# Patient Record
Sex: FEMALE | Race: BLACK OR AFRICAN AMERICAN | Employment: UNEMPLOYED | URBAN - METROPOLITAN AREA
[De-identification: names, ages, dates, MRNs, and addresses within clinical notes are randomized per-mention and may not be internally consistent; named-entity substitution may affect disease eponyms.]

---

## 2024-05-01 ENCOUNTER — HOSPITAL ENCOUNTER (EMERGENCY)
Age: 44
Discharge: HOME | End: 2024-05-01
Attending: EMERGENCY MEDICINE
Payer: COMMERCIAL

## 2024-05-01 VITALS
HEIGHT: 65 IN | SYSTOLIC BLOOD PRESSURE: 179 MMHG | OXYGEN SATURATION: 98 % | WEIGHT: 293 LBS | RESPIRATION RATE: 15 BRPM | DIASTOLIC BLOOD PRESSURE: 115 MMHG | HEART RATE: 101 BPM | TEMPERATURE: 97.7 F | BODY MASS INDEX: 48.82 KG/M2

## 2024-05-01 DIAGNOSIS — G57.11 MERALGIA PARESTHETICA OF RIGHT SIDE: Primary | ICD-10-CM

## 2024-05-01 DIAGNOSIS — M53.3 SACROILIAC JOINT PAIN: ICD-10-CM

## 2024-05-01 LAB
ALBUMIN SERPL-MCNC: 4 G/DL (ref 3.5–5)
ALBUMIN/GLOB SERPL: 0.9 (ref 1–1.9)
ALP SERPL-CCNC: 69 U/L (ref 35–104)
ALT SERPL-CCNC: 25 U/L (ref 12–65)
ANION GAP SERPL CALC-SCNC: 13 MMOL/L (ref 9–18)
AST SERPL-CCNC: 30 U/L (ref 15–37)
BASOPHILS # BLD: 0 K/UL (ref 0–0.2)
BASOPHILS NFR BLD: 0 % (ref 0–2)
BILIRUB SERPL-MCNC: 0.3 MG/DL (ref 0–1.2)
BUN SERPL-MCNC: 12 MG/DL (ref 6–23)
CALCIUM SERPL-MCNC: 9.4 MG/DL (ref 8.8–10.2)
CHLORIDE SERPL-SCNC: 100 MMOL/L (ref 98–107)
CO2 SERPL-SCNC: 27 MMOL/L (ref 20–28)
CREAT SERPL-MCNC: 0.7 MG/DL (ref 0.6–1.1)
DIFFERENTIAL METHOD BLD: NORMAL
EOSINOPHIL # BLD: 0.1 K/UL (ref 0–0.8)
EOSINOPHIL NFR BLD: 1 % (ref 0.5–7.8)
ERYTHROCYTE [DISTWIDTH] IN BLOOD BY AUTOMATED COUNT: 14.6 % (ref 11.9–14.6)
GLOBULIN SER CALC-MCNC: 4.2 G/DL (ref 2.3–3.5)
GLUCOSE SERPL-MCNC: 106 MG/DL (ref 70–99)
HCT VFR BLD AUTO: 39 % (ref 35.8–46.3)
HGB BLD-MCNC: 12.8 G/DL (ref 11.7–15.4)
IMM GRANULOCYTES # BLD AUTO: 0 K/UL (ref 0–0.5)
IMM GRANULOCYTES NFR BLD AUTO: 0 % (ref 0–5)
LYMPHOCYTES # BLD: 3.2 K/UL (ref 0.5–4.6)
LYMPHOCYTES NFR BLD: 39 % (ref 13–44)
MCH RBC QN AUTO: 28 PG (ref 26.1–32.9)
MCHC RBC AUTO-ENTMCNC: 32.8 G/DL (ref 31.4–35)
MCV RBC AUTO: 85.3 FL (ref 82–102)
MONOCYTES # BLD: 0.5 K/UL (ref 0.1–1.3)
MONOCYTES NFR BLD: 6 % (ref 4–12)
NEUTS SEG # BLD: 4.3 K/UL (ref 1.7–8.2)
NEUTS SEG NFR BLD: 54 % (ref 43–78)
NRBC # BLD: 0 K/UL (ref 0–0.2)
PLATELET # BLD AUTO: 239 K/UL (ref 150–450)
PMV BLD AUTO: 11.1 FL (ref 9.4–12.3)
POTASSIUM SERPL-SCNC: 3.5 MMOL/L (ref 3.5–5.1)
PROT SERPL-MCNC: 8.2 G/DL (ref 6.3–8.2)
RBC # BLD AUTO: 4.57 M/UL (ref 4.05–5.2)
SODIUM SERPL-SCNC: 140 MMOL/L (ref 136–145)
WBC # BLD AUTO: 8.1 K/UL (ref 4.3–11.1)

## 2024-05-01 PROCEDURE — 85025 COMPLETE CBC W/AUTO DIFF WBC: CPT

## 2024-05-01 PROCEDURE — 80053 COMPREHEN METABOLIC PANEL: CPT

## 2024-05-01 PROCEDURE — 99283 EMERGENCY DEPT VISIT LOW MDM: CPT

## 2024-05-01 RX ORDER — PREDNISONE 20 MG/1
60 TABLET ORAL DAILY
Qty: 26 TABLET | Refills: 0 | Status: SHIPPED | OUTPATIENT
Start: 2024-05-01

## 2024-05-01 RX ORDER — CYCLOBENZAPRINE HCL 10 MG
10 TABLET ORAL 3 TIMES DAILY PRN
Qty: 21 TABLET | Refills: 0 | Status: SHIPPED | OUTPATIENT
Start: 2024-05-01 | End: 2024-05-08

## 2024-05-01 ASSESSMENT — PAIN DESCRIPTION - LOCATION: LOCATION: BACK

## 2024-05-01 ASSESSMENT — PAIN DESCRIPTION - ORIENTATION: ORIENTATION: LOWER

## 2024-05-01 ASSESSMENT — PAIN SCALES - GENERAL: PAINLEVEL_OUTOF10: 10

## 2024-05-01 ASSESSMENT — PAIN - FUNCTIONAL ASSESSMENT: PAIN_FUNCTIONAL_ASSESSMENT: 0-10

## 2024-05-01 NOTE — DISCHARGE INSTR - COC
Continuity of Care Form    Patient Name: Urbano Page   :  1980  MRN:  [id number]    Admit date:  2024  Discharge date:  ***    Code Status Order: No Order   Advance Directives:     Admitting Physician:  No admitting provider for patient encounter.  PCP: Feliberto Kaiser MD    Discharging Nurse: ***  Discharging Hospital Unit/Room#: D02/D02  Discharging Unit Phone Number: ***    Emergency Contact:   Extended Emergency Contact Information  Primary Emergency Contact: GeraldDenver  Home Phone: [phone]  Relation: Child  Secondary Emergency Contact: Bryan Page  Home Phone: [phone]  Relation: Spouse    Past Surgical History:  No past surgical history on file.    Immunization History:   Immunization History   Administered Date(s) Administered    COVID-19, PFIZER PURPLE top, DILUTE for use, (age 12 y+), 30mcg/0.3mL 2021, 2021       Active Problems:  Patient Active Problem List   Diagnosis Code    Alopecia areata L63.9    Essential hypertension, benign I10    Hypopotassemia E87.6    Insomnia, unspecified G47.00       Isolation/Infection:   Isolation            No Isolation          Patient Infection Status       None to display            Nurse Assessment:  Last Vital Signs: BP (!) 179/115   Pulse (!) 101   Temp 97.7 °F (36.5 °C) (Oral)   Resp 15   Ht 1.645 m (5' 4.75\")   Wt (!) 151.3 kg (333 lb 9.6 oz)   SpO2 98%   BMI 55.94 kg/m²     Last documented pain score (0-10 scale): Pain Level: 10  Last Weight:   Wt Readings from Last 1 Encounters:   24 (!) 151.3 kg (333 lb 9.6 oz)     Mental Status:  {IP PT MENTAL STATUS:58169}    IV Access:  { EB IV ACCESS:184157224}    Nursing Mobility/ADLs:  Walking   {CHP DME ADLs:055519483}  Transfer  {CHP DME ADLs:863174968}  Bathing  {CHP DME ADLs:765052419}  Dressing  {CHP DME ADLs:293731143}  Toileting  {CHP DME ADLs:862041137}  Feeding  {CHP DME ADLs:766017478}  Med Admin  {CHP [facility] ADLs:088522805}  Med Delivery   { EB

## 2024-05-01 NOTE — DISCHARGE INSTRUCTIONS
Outpatient MRI when able  Follow up with neurology  Follow up with orthopedics  Switch from heat to ice for the low back

## 2024-05-01 NOTE — ED PROVIDER NOTES
Emergency Department Provider Note       PCP: Feliberto Kaiser MD   Age: 43 y.o.   Sex: female     DISPOSITION       No diagnosis found.    Medical Decision Making     43-year-old female with morbid obesity fibromyalgia and arthritis presents with 7 or more months of low back pain which seems to be sacroiliac in nature.  Will add muscle relaxer and prednisone 12-day taper to her pain medicine regimen, discussed MRI initially before symptoms became more clear.  Patient still wants lumbar MRI.  A requisition has been faxed to outpatient radiology scheduling.    I believe her right thigh numbness for the last 4 months is meralgia paresthetica.  She is already on a maximum dose of Lyrica.  Hopefully the prednisone may help with the some     1 or more chronic illnesses with a severe exacerbation or progression.  Over the counter drug management performed.  Prescription drug management performed.  Patient was discharged risks and benefits of hospitalization were considered.  Shared medical decision making was utilized in creating the patients health plan today.    I independently ordered and reviewed each unique test.                     History     43-year-old female presents to the ER for low back pain and right thigh numbness.  Low back pain has been present since October of last year.  It is most focally situated at the right sacroiliac joint.  There is no sciatica or bowel or bladder dysfunction to suggest cauda equina syndrome.  She is followed by rheumatology for fibromyalgia, and is currently on Lyrica 200 mg 3 times daily also takes ibuprofen 800 mg 2-3 times a day, as well as scheduled Norco tens 4 times a day.    Patient has had numbness to the right anterolateral thigh since January.  It is a dense numbness and she cannot even feel light or painful touch there.  It does not extend to the medial thigh or below the knee.    Patient has had a few falls, most recently 3 weeks ago, and then the week before that as

## 2024-05-01 NOTE — ED TRIAGE NOTES
C/o L lower back pain, headaches, and R thigh numbness (numbness started in October). Says she has fallen a few times recently and has also been having balance issues. Has taken her prescribed norco lately w/ no relief.

## 2024-05-01 NOTE — ED NOTES
Patient mobility status  with no difficulty. Provider aware     I have reviewed discharge instructions with the patient.  The patient verbalized understanding.    Patient left ED via Discharge Method: ambulatory to Home with  self .    Opportunity for questions and clarification provided.     Patient given 0 scripts.           Dimple Long LPN  05/01/24 1696

## 2024-05-02 ASSESSMENT — ENCOUNTER SYMPTOMS: BACK PAIN: 1

## 2024-05-07 ENCOUNTER — HOSPITAL ENCOUNTER (OUTPATIENT)
Dept: MRI IMAGING | Age: 44
Discharge: HOME | End: 2024-05-10
Attending: EMERGENCY MEDICINE
Payer: COMMERCIAL

## 2024-05-07 DIAGNOSIS — R20.0 LEG NUMBNESS: ICD-10-CM

## 2024-05-07 DIAGNOSIS — M54.9 BACK PAIN, UNSPECIFIED BACK LOCATION, UNSPECIFIED BACK PAIN LATERALITY, UNSPECIFIED CHRONICITY: ICD-10-CM

## 2024-05-07 PROCEDURE — 72148 MRI LUMBAR SPINE W/O DYE: CPT

## 2024-05-21 ENCOUNTER — OFFICE VISIT (OUTPATIENT)
Age: 44
End: 2024-05-21
Payer: COMMERCIAL

## 2024-05-21 DIAGNOSIS — M47.816 FACET ARTHROPATHY, LUMBAR: ICD-10-CM

## 2024-05-21 DIAGNOSIS — R29.2 GENERALIZED HYPERREFLEXIA: ICD-10-CM

## 2024-05-21 DIAGNOSIS — M54.16 LUMBAR RADICULOPATHY: ICD-10-CM

## 2024-05-21 DIAGNOSIS — M51.36 DDD (DEGENERATIVE DISC DISEASE), LUMBAR: Primary | ICD-10-CM

## 2024-05-21 DIAGNOSIS — G57.11 MERALGIA PARESTHETICA OF RIGHT SIDE: ICD-10-CM

## 2024-05-21 DIAGNOSIS — M54.12 CERVICAL RADICULOPATHY: ICD-10-CM

## 2024-05-21 PROCEDURE — 99204 OFFICE O/P NEW MOD 45 MIN: CPT | Performed by: PHYSICIAN ASSISTANT

## 2024-05-21 RX ORDER — OLMESARTAN MEDOXOMIL 40 MG/1
40 TABLET ORAL DAILY
COMMUNITY
Start: 2024-01-22

## 2024-05-21 RX ORDER — ONDANSETRON 4 MG/1
4 TABLET, ORALLY DISINTEGRATING ORAL 3 TIMES DAILY PRN
COMMUNITY
Start: 2022-06-08

## 2024-05-21 RX ORDER — SPIRONOLACTONE 25 MG/1
25 TABLET ORAL DAILY
COMMUNITY
Start: 2024-01-22

## 2024-05-21 RX ORDER — TRAZODONE HYDROCHLORIDE 100 MG/1
200 TABLET ORAL NIGHTLY
COMMUNITY

## 2024-05-21 RX ORDER — HYDROXYZINE PAMOATE 25 MG/1
CAPSULE ORAL
COMMUNITY
Start: 2024-05-10

## 2024-05-21 RX ORDER — HYDROCODONE BITARTRATE AND ACETAMINOPHEN 10; 325 MG/1; MG/1
TABLET ORAL
COMMUNITY

## 2024-05-21 RX ORDER — DEXTROAMPHETAMINE SACCHARATE, AMPHETAMINE ASPARTATE MONOHYDRATE, DEXTROAMPHETAMINE SULFATE AND AMPHETAMINE SULFATE 7.5; 7.5; 7.5; 7.5 MG/1; MG/1; MG/1; MG/1
30 CAPSULE, EXTENDED RELEASE ORAL 2 TIMES DAILY
COMMUNITY

## 2024-05-21 RX ORDER — CLONAZEPAM 1 MG/1
1 TABLET ORAL 2 TIMES DAILY
COMMUNITY

## 2024-05-21 RX ORDER — ALBUTEROL SULFATE 90 UG/1
2 AEROSOL, METERED RESPIRATORY (INHALATION) EVERY 4 HOURS PRN
COMMUNITY
Start: 2023-12-04

## 2024-05-21 RX ORDER — IBUPROFEN 800 MG/1
800 TABLET ORAL 3 TIMES DAILY PRN
COMMUNITY

## 2024-05-21 RX ORDER — TORSEMIDE 20 MG/1
60 TABLET ORAL DAILY
COMMUNITY
Start: 2024-05-09

## 2024-05-21 RX ORDER — CYCLOBENZAPRINE HCL 10 MG
10 TABLET ORAL 3 TIMES DAILY PRN
COMMUNITY

## 2024-05-21 RX ORDER — PREGABALIN 75 MG/1
200 CAPSULE ORAL 3 TIMES DAILY
COMMUNITY

## 2024-05-21 NOTE — PROGRESS NOTES
Name: Urbano Page  YOB: 1980  Gender: female  MRN: [id number]    CC: Back Pain (Low back pain and right thigh numbness)       HPI: This is a 43 y.o. year old female who  Is referred to me from [facility] emergency department after presenting with over 7-month history of lower back pain.   Back pain began around August 2023 mostly the left side of the back left upper buttock pain.  She then began having numbness in the right thigh December 23 it does not go below the knee.  More the pain is on the left and it is worse after standing for an hour.  She describes it as a blowtorch sensation.  Symptoms are exacerbated with sitting standing laying down.  She has tried Lyrica, hydrocodone, ibuprofen.  She is under the care of Dr. Veliz with pain management.  He has treated cervical spine but has not treated her for the lumbar problems.    MRI of the lumbar spine was ordered at the emergency department and she has it today.    The patient does say she has neck pain decreased range of motion of the cervical spine.  She does notice weakness with her hands.           5/21/2024    12:59 PM   AMB PAIN ASSESSMENT   Location of Pain Back    Location Modifiers Right   Quality of Pain Aching;Other (Comment)    Duration of Pain Persistent   Frequency of Pain Constant   Date Pain First Started 10/10/2023   Aggravating Factors Bending   Limiting Behavior Some   Relieving Factors Other (Comment)    Result of Injury No   Work-Related Injury No   Are there other pain locations you wish to document? No       Significant value              ROS/Meds/PSH/PMH/FH/SH: I personally reviewed the patient's collected intake data.  Below are the pertinents:    No Known Allergies      Current Outpatient Medications:     albuterol sulfate HFA (PROVENTIL;VENTOLIN;PROAIR) 108 (90 Base) MCG/ACT inhaler, Inhale 2 puffs into the lungs every 4 hours as needed, Disp: , Rfl:     amphetamine-dextroamphetamine (ADDERALL XR) 30 MG extended

## 2024-05-27 NOTE — PROGRESS NOTES
[facility] [address], [address]  [place] [address]  [phone]      Chief Complaint   Patient presents with    ED Follow-up     Meralgia paresthetica, cognitive changes       HPI  Letorrjohnny Page is a 43 y.o. female with past medical history of fibromyalgia who presents for ED follow-up.  She is present with her  who helps with history.  On 05/01 patient was feeling severe pain in her back and right lower extremity.  She describes the pain as hot/burning and \"like knives are slicing through her leg and back.\"  She was told she had possible nerve involvement and was discharged from the ED with follow-up to neurology and orthospine.  She has been dealing with generalized pain for a long time, and has been seeing pain management this, as well as rheumatology in the past.  This new onset of right lower extremity pain and changing low back pain started in about January.  The back pain seems to be worse with bending over and changing positions, the right leg pain has less positional components to it.  Right leg pain is generally in the outside of her right leg, does not usually cross into medial side of her leg however does cover the front as well.  She does not feel it in the back of her leg other than tightness.  She has been evaluated by orthospine and they are not recommending surgical interventions at this time.  She continues on with other complaints about stressors in her life over the last few years.  She expresses deep sadness and concern over her inability to work and her generalized state of pain.  She has concerns over the stigma of her going to pain management, and also is feeling a little hope regarding her recovery process.  During her most recent pain management appointment they told her that she had a positive Veronique reflex, but this was not recreated at her orthospine visit.  She is currently seeing psychiatry and is on multiple medications that she has

## 2024-05-28 ENCOUNTER — OFFICE VISIT (OUTPATIENT)
Dept: NEUROLOGY | Age: 44
End: 2024-05-28
Payer: COMMERCIAL

## 2024-05-28 VITALS
HEIGHT: 65 IN | WEIGHT: 293 LBS | SYSTOLIC BLOOD PRESSURE: 138 MMHG | OXYGEN SATURATION: 97 % | DIASTOLIC BLOOD PRESSURE: 88 MMHG | HEART RATE: 98 BPM | BODY MASS INDEX: 48.82 KG/M2

## 2024-05-28 DIAGNOSIS — M54.50 CHRONIC LEFT-SIDED LOW BACK PAIN, UNSPECIFIED WHETHER SCIATICA PRESENT: ICD-10-CM

## 2024-05-28 DIAGNOSIS — G57.11 MERALGIA PARESTHETICA OF RIGHT SIDE: Primary | ICD-10-CM

## 2024-05-28 DIAGNOSIS — G89.29 CHRONIC LEFT-SIDED LOW BACK PAIN, UNSPECIFIED WHETHER SCIATICA PRESENT: ICD-10-CM

## 2024-05-28 DIAGNOSIS — F32.A DEPRESSION, UNSPECIFIED DEPRESSION TYPE: ICD-10-CM

## 2024-05-28 PROCEDURE — 99205 OFFICE O/P NEW HI 60 MIN: CPT | Performed by: PHYSICAL THERAPIST

## 2024-05-28 PROCEDURE — 3079F DIAST BP 80-89 MM HG: CPT | Performed by: PHYSICAL THERAPIST

## 2024-05-28 PROCEDURE — 3075F SYST BP GE 130 - 139MM HG: CPT | Performed by: PHYSICAL THERAPIST

## 2024-05-28 ASSESSMENT — PATIENT HEALTH QUESTIONNAIRE - PHQ9
SUM OF ALL RESPONSES TO PHQ QUESTIONS 1-9: 1
2. FEELING DOWN, DEPRESSED OR HOPELESS: SEVERAL DAYS
SUM OF ALL RESPONSES TO PHQ QUESTIONS 1-9: 1
SUM OF ALL RESPONSES TO PHQ9 QUESTIONS 1 & 2: 1
1. LITTLE INTEREST OR PLEASURE IN DOING THINGS: NOT AT ALL
SUM OF ALL RESPONSES TO PHQ QUESTIONS 1-9: 1
SUM OF ALL RESPONSES TO PHQ QUESTIONS 1-9: 1

## 2024-06-04 RX ORDER — PREDNISONE 20 MG/1
40 TABLET ORAL DAILY
COMMUNITY
Start: 2023-12-04

## 2024-06-05 ENCOUNTER — OFFICE VISIT (OUTPATIENT)
Age: 44
End: 2024-06-05
Payer: COMMERCIAL

## 2024-06-05 DIAGNOSIS — M48.02 CERVICAL SPINAL STENOSIS: Primary | ICD-10-CM

## 2024-06-05 DIAGNOSIS — M50.10 HERNIATION OF CERVICAL INTERVERTEBRAL DISC WITH RADICULOPATHY: ICD-10-CM

## 2024-06-05 PROCEDURE — 99214 OFFICE O/P EST MOD 30 MIN: CPT | Performed by: PHYSICIAN ASSISTANT

## 2024-06-05 NOTE — PATIENT INSTRUCTIONS
Cervical Stenosis    What is cervical stenosis?  Cervical stenosis is a condition in which the spinal canal in the neck is too small for the spinal cord and exiting spinal nerves.   The narrow canal results in pinching of these important structures, which can eventually lead to a variety of symptoms.  Spinal stenosis can develop after a single event, such as a fall or other injury.  But, more often it develops over a period of months or years due to progressive degenerative and arthritic changes in the spine.  Though the patient may not be aware of its development, spinal stenosis may become rapidly apparent once the dimensions of the spinal canal become critically small.             What causes cervical stenosis?  Again, cervical stenosis may be the result of an acute injury such as a fracture or a disc herniation.  However, in most cases cervical stenosis develops as a result of a combination of disc degeneration, bone spurs, thickened ligaments, and lax joints in the spine.  All of these can cause a narrowing of the bony canal that holds the spinal cord.  Aside from the physical pinching of the spinal cord, this narrowing also causes sluggish blood flow to the spinal cord.    Some patients are genetically programmed to develop a small spinal canal diameter.  These patients are predisposed to develop symptoms of cervical stenosis at an earlier age than patients who start off with a normal size spinal canal.   Other predisposing factors include conditions such as rheumatoid arthritis and osteoarthritis.  Nicotine has been suggested to have a negative effect on the health of the intervertebral discs, which may play a role in early collapse and degeneration.    What are the symptoms of cervical stenosis?  Cervical stenosis can cause a variety of symptoms, some of which may be very similar to other unrelated conditions.  Patients with cervical stenosis often report a history of episodic neck pain and or headaches.

## 2024-06-05 NOTE — PROGRESS NOTES
Name: Urbano Page  YOB: 1980  Gender: female  MRN: [id number]    CC: Neck Pain (MRI results)       HPI: This is a 43 y.o. year old female who is referred to me from [facility] emergency department after presenting with over 7-month history of lower back pain.   Back pain began around August 2023 mostly the left side of the back left upper buttock pain.  She then began having numbness in the right thigh December 23 it does not go below the knee.  More of the pain is on the left and it is worse after standing for an hour.  She describes it as a blowtorch sensation.  Symptoms are exacerbated with sitting standing laying down.  She has tried Lyrica, hydrocodone, ibuprofen.  She is under the care of Dr. Veliz with pain management.  She was referred to Dr. Veliz by her rheumatologist.  She has not had injections by Dr. Veliz..    MRI of the lumbar spine was reviewed by me at our last visit, revealing facet arthropathy L3-4 but no significant stenosis, degenerative disc changes at L4-5 with facet arthropathy, disc bulge at L4-5 with mild stenosis.  No significant foraminal stenosis.  No other significant abnormalities noted.     The patient also reported neck pain decreased range of motion of the cervical spine.  She does notice weakness with her hands, and notes that she frequently drops objects.  She has pain across the back of her shoulders and occasionally into her right arm.  She notices that her left hand tremors.  She had weakness with  and intrinsics bilaterally and she had a positive Veronique's.  I ordered MRI of the cervical spine to further evaluate cervical etiology of her pain.           5/21/2024    12:59 PM   AMB PAIN ASSESSMENT   Location of Pain Back    Location Modifiers Right   Quality of Pain Aching;Other (Comment)    Duration of Pain Persistent   Frequency of Pain Constant   Date Pain First Started 10/10/2023   Aggravating Factors Bending   Limiting Behavior Some   Relieving

## 2024-06-24 NOTE — PROGRESS NOTES
[facility] [address], [address]  [place] [address]  [phone]      No chief complaint on file.      Original HPI  Lettian Page is a 43 y.o. female with past medical history of fibromyalgia who presents for ED follow-up.  She is present with her  who helps with history.  On 05/01 patient was feeling severe pain in her back and right lower extremity.  She describes the pain as hot/burning and \"like knives are slicing through her leg and back.\"  She was told she had possible nerve involvement and was discharged from the ED with follow-up to neurology and orthospine.  She has been dealing with generalized pain for a long time, and has been seeing pain management this, as well as rheumatology in the past.  This new onset of right lower extremity pain and changing low back pain started in about January.  The back pain seems to be worse with bending over and changing positions, the right leg pain has less positional components to it.  Right leg pain is generally in the outside of her right leg, does not usually cross into medial side of her leg however does cover the front as well.  She does not feel it in the back of her leg other than tightness.  She has been evaluated by orthospine and they are not recommending surgical interventions at this time.  She continues on with other complaints about stressors in her life over the last few years.  She expresses deep sadness and concern over her inability to work and her generalized state of pain.  She has concerns over the stigma of her going to pain management, and also is feeling a little hope regarding her recovery process.  During her most recent pain management appointment they told her that she had a positive Veronique reflex, but this was not recreated at her orthospine visit.  She is currently seeing psychiatry and is on multiple medications that she has been trialing to the years.    Interval History  Pt returns today

## 2024-06-25 ENCOUNTER — OFFICE VISIT (OUTPATIENT)
Dept: NEUROLOGY | Age: 44
End: 2024-06-25
Payer: COMMERCIAL

## 2024-06-25 VITALS — OXYGEN SATURATION: 97 % | SYSTOLIC BLOOD PRESSURE: 136 MMHG | DIASTOLIC BLOOD PRESSURE: 86 MMHG | HEART RATE: 92 BPM

## 2024-06-25 DIAGNOSIS — F32.A DEPRESSION, UNSPECIFIED DEPRESSION TYPE: ICD-10-CM

## 2024-06-25 DIAGNOSIS — G57.11 MERALGIA PARESTHETICA OF RIGHT SIDE: ICD-10-CM

## 2024-06-25 DIAGNOSIS — G31.84 MILD COGNITIVE IMPAIRMENT: Primary | ICD-10-CM

## 2024-06-25 PROCEDURE — 99214 OFFICE O/P EST MOD 30 MIN: CPT | Performed by: PHYSICAL THERAPIST

## 2024-06-25 PROCEDURE — 3079F DIAST BP 80-89 MM HG: CPT | Performed by: PHYSICAL THERAPIST

## 2024-06-25 PROCEDURE — 3075F SYST BP GE 130 - 139MM HG: CPT | Performed by: PHYSICAL THERAPIST

## 2024-06-25 RX ORDER — OLMESARTAN MEDOXOMIL AND HYDROCHLOROTHIAZIDE 40/25 40; 25 MG/1; MG/1
1 TABLET ORAL DAILY
COMMUNITY
Start: 2024-06-06 | End: 2025-06-06

## 2024-06-25 RX ORDER — ZOLPIDEM TARTRATE 10 MG/1
10 TABLET ORAL NIGHTLY PRN
COMMUNITY
Start: 2024-06-12

## 2024-08-06 ENCOUNTER — HOSPITAL ENCOUNTER (OUTPATIENT)
Dept: MRI IMAGING | Age: 44
Discharge: HOME | End: 2024-08-09
Payer: COMMERCIAL

## 2024-08-06 DIAGNOSIS — G31.84 MILD COGNITIVE IMPAIRMENT: ICD-10-CM

## 2024-08-06 PROCEDURE — 2580000003 HC RX 258: Performed by: PHYSICAL THERAPIST

## 2024-08-06 PROCEDURE — 70553 MRI BRAIN STEM W/O & W/DYE: CPT

## 2024-08-06 PROCEDURE — A9579 GAD-BASE MR CONTRAST NOS,1ML: HCPCS | Performed by: PHYSICAL THERAPIST

## 2024-08-06 PROCEDURE — 6360000004 HC RX CONTRAST MEDICATION: Performed by: PHYSICAL THERAPIST

## 2024-08-06 RX ORDER — SODIUM CHLORIDE 0.9 % (FLUSH) 0.9 %
10 SYRINGE (ML) INJECTION AS NEEDED
Status: DISCONTINUED | OUTPATIENT
Start: 2024-08-06 | End: 2024-08-10 | Stop reason: HOSPADM

## 2024-08-06 RX ADMIN — SODIUM CHLORIDE, PRESERVATIVE FREE 10 ML: 5 INJECTION INTRAVENOUS at 20:56

## 2024-08-06 RX ADMIN — GADOTERIDOL 30 ML: 279.3 INJECTION, SOLUTION INTRAVENOUS at 20:56

## 2024-10-29 ENCOUNTER — OFFICE VISIT (OUTPATIENT)
Age: 44
End: 2024-10-29
Payer: COMMERCIAL

## 2024-10-29 ENCOUNTER — TELEPHONE (OUTPATIENT)
Dept: ORTHOPEDIC SURGERY | Age: 44
End: 2024-10-29

## 2024-10-29 VITALS — HEIGHT: 65 IN | BODY MASS INDEX: 48.82 KG/M2 | WEIGHT: 293 LBS

## 2024-10-29 DIAGNOSIS — M54.9 BACK PAIN, UNSPECIFIED BACK LOCATION, UNSPECIFIED BACK PAIN LATERALITY, UNSPECIFIED CHRONICITY: ICD-10-CM

## 2024-10-29 DIAGNOSIS — M54.2 NECK PAIN: Primary | ICD-10-CM

## 2024-10-29 PROCEDURE — 99213 OFFICE O/P EST LOW 20 MIN: CPT | Performed by: ORTHOPAEDIC SURGERY

## 2024-10-29 NOTE — TELEPHONE ENCOUNTER
She had asked for a referral for PT but she needs it sent externally [facility]

## 2024-10-29 NOTE — TELEPHONE ENCOUNTER
Returned patients call she is aware order will be faxed to [facility] at [facility] per her request.

## 2024-10-30 NOTE — PROGRESS NOTES
Name: Urbano Page  YOB: 1980  Gender: female  MRN: [id number]  Age: 44 y.o.    Chief Complaint: Neck pain and bilateral shoulder pain  History of present illness:    This is a very pleasant 44 y.o. female who presents with history of neck pain with radiation to bilateral shoulders since May 2024.  She states that she has limited range of motion in her neck.  Denies any injury.  She denies any paresthesias in her upper extremity.  She states that she occasionally has issues with balance.  She states she has fallen approximately 4 times since her symptoms started.  She is in pain management and had a cervical epidural steroid injection in July which helped 65%.  She has done 4 weeks of physical therapy.  She is also tried Lyrica, hydrocodone, ibuprofen.  Currently her pain is 4/10 in severity.  She states her pain is worsened by raising her arms.      Medications:     Prior to Visit Medications    Medication Sig Taking? Authorizing Provider   zolpidem (AMBIEN) 10 MG tablet Take 1 tablet by mouth nightly as needed for Sleep.  John Medina MD   olmesartan-hydroCHLOROthiazide (BENICAR HCT) 40-25 MG per tablet Take 1 tablet by mouth daily  Patient not taking: Reported on 6/25/2024  John Medina MD   predniSONE (DELTASONE) 20 MG tablet Take 2 tablets by mouth daily  John Medina MD   albuterol sulfate HFA (PROVENTIL;VENTOLIN;PROAIR) 108 (90 Base) MCG/ACT inhaler Inhale 2 puffs into the lungs every 4 hours as needed  John Medina MD   amphetamine-dextroamphetamine (ADDERALL XR) 30 MG extended release capsule Take 1 capsule by mouth 2 times daily.  John Medina MD   clonazePAM (KLONOPIN) 1 MG tablet Take 1 tablet by mouth 2 times daily.  John Medina MD   cyclobenzaprine (FLEXERIL) 10 MG tablet Take 1 tablet by mouth 3 times daily as needed  John Medina MD   HYDROcodone-acetaminophen (NORCO)  MG per tablet TAKE ONE TABLET BY MOUTH

## 2025-05-15 ENCOUNTER — HOSPITAL ENCOUNTER (EMERGENCY)
Age: 45
Discharge: HOME | End: 2025-05-16
Attending: EMERGENCY MEDICINE
Payer: COMMERCIAL

## 2025-05-15 ENCOUNTER — APPOINTMENT (OUTPATIENT)
Dept: CT IMAGING | Age: 45
End: 2025-05-15
Payer: COMMERCIAL

## 2025-05-15 VITALS
OXYGEN SATURATION: 99 % | RESPIRATION RATE: 20 BRPM | TEMPERATURE: 98.5 F | DIASTOLIC BLOOD PRESSURE: 84 MMHG | BODY MASS INDEX: 48.82 KG/M2 | WEIGHT: 293 LBS | SYSTOLIC BLOOD PRESSURE: 130 MMHG | HEIGHT: 65 IN

## 2025-05-15 DIAGNOSIS — M54.6 BILATERAL THORACIC BACK PAIN, UNSPECIFIED CHRONICITY: Primary | ICD-10-CM

## 2025-05-15 LAB
ALBUMIN SERPL-MCNC: 3.6 G/DL (ref 3.5–5)
ALBUMIN/GLOB SERPL: 0.8 (ref 1–1.9)
ALP SERPL-CCNC: 74 U/L (ref 35–104)
ALT SERPL-CCNC: 21 U/L (ref 8–45)
ANION GAP SERPL CALC-SCNC: 10 MMOL/L (ref 7–16)
APPEARANCE UR: CLEAR
AST SERPL-CCNC: 27 U/L (ref 15–37)
BASOPHILS # BLD: 0.05 K/UL (ref 0–0.2)
BASOPHILS NFR BLD: 0.5 % (ref 0–2)
BILIRUB SERPL-MCNC: 0.2 MG/DL (ref 0–1.2)
BILIRUB UR QL: NEGATIVE
BUN SERPL-MCNC: 9 MG/DL (ref 6–23)
CALCIUM SERPL-MCNC: 9.7 MG/DL (ref 8.8–10.2)
CHLORIDE SERPL-SCNC: 100 MMOL/L (ref 98–107)
CO2 SERPL-SCNC: 26 MMOL/L (ref 20–29)
COLOR UR: NORMAL
CREAT SERPL-MCNC: 0.78 MG/DL (ref 0.6–1.1)
DIFFERENTIAL METHOD BLD: NORMAL
EOSINOPHIL # BLD: 0.1 K/UL (ref 0–0.8)
EOSINOPHIL NFR BLD: 1 % (ref 0.5–7.8)
ERYTHROCYTE [DISTWIDTH] IN BLOOD BY AUTOMATED COUNT: 13.8 % (ref 11.9–14.6)
GLOBULIN SER CALC-MCNC: 4.3 G/DL (ref 2.3–3.5)
GLUCOSE SERPL-MCNC: 115 MG/DL (ref 70–99)
GLUCOSE UR STRIP.AUTO-MCNC: NEGATIVE MG/DL
HCG UR QL: NEGATIVE
HCT VFR BLD AUTO: 37.7 % (ref 35.8–46.3)
HGB BLD-MCNC: 12.2 G/DL (ref 11.7–15.4)
HGB UR QL STRIP: NEGATIVE
IMM GRANULOCYTES # BLD AUTO: 0.04 K/UL (ref 0–0.5)
IMM GRANULOCYTES NFR BLD AUTO: 0.4 % (ref 0–5)
KETONES UR QL STRIP.AUTO: NEGATIVE MG/DL
LACTATE SERPL-SCNC: 1.3 MMOL/L (ref 0.5–2)
LEUKOCYTE ESTERASE UR QL STRIP.AUTO: NEGATIVE
LIPASE SERPL-CCNC: 11 U/L (ref 13–60)
LYMPHOCYTES # BLD: 3.87 K/UL (ref 0.5–4.6)
LYMPHOCYTES NFR BLD: 39.6 % (ref 13–44)
MCH RBC QN AUTO: 27.2 PG (ref 26.1–32.9)
MCHC RBC AUTO-ENTMCNC: 32.4 G/DL (ref 31.4–35)
MCV RBC AUTO: 84.2 FL (ref 82–102)
MONOCYTES # BLD: 0.83 K/UL (ref 0.1–1.3)
MONOCYTES NFR BLD: 8.5 % (ref 4–12)
NEUTS SEG # BLD: 4.89 K/UL (ref 1.7–8.2)
NEUTS SEG NFR BLD: 50 % (ref 43–78)
NITRITE UR QL STRIP.AUTO: NEGATIVE
NRBC # BLD: 0 K/UL (ref 0–0.2)
PH UR STRIP: 6 (ref 5–9)
PLATELET # BLD AUTO: 278 K/UL (ref 150–450)
PMV BLD AUTO: 10.4 FL (ref 9.4–12.3)
POTASSIUM SERPL-SCNC: 3.9 MMOL/L (ref 3.5–5.1)
PROCALCITONIN SERPL-MCNC: <0.02 NG/ML (ref 0–0.1)
PROT SERPL-MCNC: 7.9 G/DL (ref 6.3–8.2)
PROT UR STRIP-MCNC: NEGATIVE MG/DL
RBC # BLD AUTO: 4.48 M/UL (ref 4.05–5.2)
SODIUM SERPL-SCNC: 136 MMOL/L (ref 136–145)
SP GR UR REFRACTOMETRY: 1.01 (ref 1–1.02)
UROBILINOGEN UR QL STRIP.AUTO: 0.2 EU/DL (ref 0.2–1)
WBC # BLD AUTO: 9.8 K/UL (ref 4.3–11.1)

## 2025-05-15 PROCEDURE — 6360000002 HC RX W HCPCS: Performed by: EMERGENCY MEDICINE

## 2025-05-15 PROCEDURE — 99284 EMERGENCY DEPT VISIT MOD MDM: CPT

## 2025-05-15 PROCEDURE — 96374 THER/PROPH/DIAG INJ IV PUSH: CPT

## 2025-05-15 PROCEDURE — 81003 URINALYSIS AUTO W/O SCOPE: CPT

## 2025-05-15 PROCEDURE — 2580000003 HC RX 258: Performed by: EMERGENCY MEDICINE

## 2025-05-15 PROCEDURE — 81025 URINE PREGNANCY TEST: CPT

## 2025-05-15 PROCEDURE — 96375 TX/PRO/DX INJ NEW DRUG ADDON: CPT

## 2025-05-15 PROCEDURE — 80053 COMPREHEN METABOLIC PANEL: CPT

## 2025-05-15 PROCEDURE — 83605 ASSAY OF LACTIC ACID: CPT

## 2025-05-15 PROCEDURE — 83690 ASSAY OF LIPASE: CPT

## 2025-05-15 PROCEDURE — 84145 PROCALCITONIN (PCT): CPT

## 2025-05-15 PROCEDURE — 85025 COMPLETE CBC W/AUTO DIFF WBC: CPT

## 2025-05-15 RX ORDER — ONDANSETRON 2 MG/ML
4 INJECTION INTRAMUSCULAR; INTRAVENOUS
Status: COMPLETED | OUTPATIENT
Start: 2025-05-15 | End: 2025-05-15

## 2025-05-15 RX ORDER — 0.9 % SODIUM CHLORIDE 0.9 %
1000 INTRAVENOUS SOLUTION INTRAVENOUS ONCE
Status: COMPLETED | OUTPATIENT
Start: 2025-05-15 | End: 2025-05-16

## 2025-05-15 RX ORDER — MORPHINE SULFATE 4 MG/ML
4 INJECTION, SOLUTION INTRAMUSCULAR; INTRAVENOUS
Refills: 0 | Status: COMPLETED | OUTPATIENT
Start: 2025-05-15 | End: 2025-05-15

## 2025-05-15 RX ADMIN — SODIUM CHLORIDE 1000 ML: 0.9 INJECTION, SOLUTION INTRAVENOUS at 23:25

## 2025-05-15 RX ADMIN — ONDANSETRON 4 MG: 2 INJECTION, SOLUTION INTRAMUSCULAR; INTRAVENOUS at 23:25

## 2025-05-15 RX ADMIN — MORPHINE SULFATE 4 MG: 4 INJECTION, SOLUTION INTRAMUSCULAR; INTRAVENOUS at 23:25

## 2025-05-15 ASSESSMENT — PAIN DESCRIPTION - LOCATION
LOCATION: FLANK
LOCATION: ABDOMEN;FLANK

## 2025-05-15 ASSESSMENT — PAIN SCALES - GENERAL
PAINLEVEL_OUTOF10: 10
PAINLEVEL_OUTOF10: 10

## 2025-05-15 ASSESSMENT — PAIN DESCRIPTION - ORIENTATION: ORIENTATION: RIGHT;LEFT

## 2025-05-15 ASSESSMENT — LIFESTYLE VARIABLES: HOW OFTEN DO YOU HAVE A DRINK CONTAINING ALCOHOL: NEVER

## 2025-05-15 ASSESSMENT — PAIN - FUNCTIONAL ASSESSMENT: PAIN_FUNCTIONAL_ASSESSMENT: 0-10

## 2025-05-16 ENCOUNTER — APPOINTMENT (OUTPATIENT)
Dept: CT IMAGING | Age: 45
End: 2025-05-16
Payer: COMMERCIAL

## 2025-05-16 PROCEDURE — 74176 CT ABD & PELVIS W/O CONTRAST: CPT

## 2025-05-16 RX ORDER — LIDOCAINE 50 MG/G
1 PATCH TOPICAL DAILY
Qty: 10 PATCH | Refills: 0 | Status: SHIPPED | OUTPATIENT
Start: 2025-05-16 | End: 2025-05-26

## 2025-05-16 RX ORDER — ONDANSETRON 4 MG/1
4 TABLET, ORALLY DISINTEGRATING ORAL 3 TIMES DAILY PRN
Qty: 21 TABLET | Refills: 0 | Status: SHIPPED | OUTPATIENT
Start: 2025-05-16

## 2025-05-16 NOTE — DISCHARGE INSTRUCTIONS
If you have leg weakness, difficulty breathing, or if you have any other concerning symptoms, please return to the ER immediately.

## 2025-05-16 NOTE — ED PROVIDER NOTES
Emergency Department Provider Note       [facility] EMERGENCY DEPT   PCP: No primary care provider on file.   Age: 44 y.o.   Sex: female     DISPOSITION Decision To Discharge 05/16/2025 01:26:24 AM    ICD-10-CM    1. Bilateral thoracic back pain, unspecified chronicity  M54.6 [facility]          Medical Decision Making     Patient comes to the ED for evaluation of bilateral flank pain that radiates anteriorly.  Patient states pain has been ongoing for the past 2 days.  Patient also reports urinary frequency and pelvic pressure.  She denies dysuria.  Patient without F/C.  Patient reports a decreased appetite.  Patient also tearful, states she is depressed.  Patient without SI, denies having a plan for suicide.  S/O at bedside.  Pt does report a hx of CKD, states she has not followed with nephrology as she was instructed to.     CBC without leukocytosis or anemia.  CMP unremarkable.  UA without UTI.  UCG negative.  Lactic acid normal at 1.3.  Procalcitonin within normal limits.  CT A/P without contrast is unremarkable.    Patient reassured.  Stable for DC home.  Will give prescriptions for Lidoderm patches and refer patient to PCP to establish care.  Strict return precautions discussed.     1 acute illness with systemic symptoms.    Over the counter drug management performed.  Prescription drug management performed.  Shared medical decision making was utilized in creating the patients health plan today.    I independently ordered and reviewed each unique test.    I reviewed external records: ED visit note from a different ED.      I interpreted the CT Scan CT A/P without IV contrast is without ureteral calculus or hydronephrosis..        History     Patient comes to the ED for evaluation of bilateral flank pain that radiates anteriorly.  Patient states pain has been ongoing for the past 2 days.  Patient also reports urinary frequency and pelvic pressure.  She denies dysuria.